# Patient Record
Sex: FEMALE | Race: WHITE | NOT HISPANIC OR LATINO | Employment: OTHER | URBAN - METROPOLITAN AREA
[De-identification: names, ages, dates, MRNs, and addresses within clinical notes are randomized per-mention and may not be internally consistent; named-entity substitution may affect disease eponyms.]

---

## 2021-12-14 ENCOUNTER — OFFICE VISIT (OUTPATIENT)
Dept: DERMATOLOGY | Age: 71
End: 2021-12-14
Payer: COMMERCIAL

## 2021-12-14 VITALS — HEIGHT: 63 IN | TEMPERATURE: 98.8 F | WEIGHT: 149 LBS | BODY MASS INDEX: 26.4 KG/M2

## 2021-12-14 DIAGNOSIS — L40.9 PSORIASIS: ICD-10-CM

## 2021-12-14 DIAGNOSIS — D22.70 MULTIPLE BENIGN MELANOCYTIC NEVI OF UPPER EXTREMITY, LOWER EXTREMITY, AND TRUNK: ICD-10-CM

## 2021-12-14 DIAGNOSIS — L81.4 SOLAR LENTIGO: Primary | ICD-10-CM

## 2021-12-14 DIAGNOSIS — D22.5 MULTIPLE BENIGN MELANOCYTIC NEVI OF UPPER EXTREMITY, LOWER EXTREMITY, AND TRUNK: ICD-10-CM

## 2021-12-14 DIAGNOSIS — D18.01 CHERRY ANGIOMA: ICD-10-CM

## 2021-12-14 DIAGNOSIS — D22.60 MULTIPLE BENIGN MELANOCYTIC NEVI OF UPPER EXTREMITY, LOWER EXTREMITY, AND TRUNK: ICD-10-CM

## 2021-12-14 DIAGNOSIS — L82.1 SEBORRHEIC KERATOSES: ICD-10-CM

## 2021-12-14 PROCEDURE — 99204 OFFICE O/P NEW MOD 45 MIN: CPT | Performed by: DERMATOLOGY

## 2021-12-14 RX ORDER — MOMETASONE FUROATE 1 MG/ML
SOLUTION TOPICAL
COMMUNITY
Start: 2021-10-05

## 2021-12-14 RX ORDER — CALCIPOTRIENE 0.05 MG/ML
SOLUTION TOPICAL
COMMUNITY

## 2021-12-14 RX ORDER — SIMVASTATIN 40 MG
TABLET ORAL
COMMUNITY
Start: 2021-10-22

## 2021-12-14 RX ORDER — BIOTIN 5 MG
TABLET ORAL DAILY
COMMUNITY

## 2021-12-14 RX ORDER — MOMETASONE FUROATE 1 MG/G
OINTMENT TOPICAL
COMMUNITY

## 2021-12-14 RX ORDER — PHENOL 1.4 %
AEROSOL, SPRAY (ML) MUCOUS MEMBRANE DAILY
COMMUNITY

## 2022-12-08 ENCOUNTER — OFFICE VISIT (OUTPATIENT)
Dept: DERMATOLOGY | Age: 72
End: 2022-12-08

## 2022-12-08 VITALS — HEIGHT: 63 IN | WEIGHT: 151 LBS | BODY MASS INDEX: 26.75 KG/M2 | TEMPERATURE: 96.7 F

## 2022-12-08 DIAGNOSIS — L82.0 INFLAMED SEBORRHEIC KERATOSIS: ICD-10-CM

## 2022-12-08 DIAGNOSIS — L81.4 SOLAR LENTIGO: Primary | ICD-10-CM

## 2022-12-08 DIAGNOSIS — L40.9 PSORIASIS: ICD-10-CM

## 2022-12-08 DIAGNOSIS — L82.1 SEBORRHEIC KERATOSIS: ICD-10-CM

## 2022-12-08 NOTE — PROGRESS NOTES
[facility]     Patient Name: Taya Youssef  Encounter Date: 12/8/2022     Have you been [facility] in the last 3 years and, if so, which description applies to you? Yes  I have been here within the last 3 years, and my medical history has NOT changed since that time  I am FEMALE/of child-bearing potential     REVIEW OF SYSTEMS:  Have you recently had or currently have any of the following? · No changes in my recent health  PAST MEDICAL HISTORY:  Have you personally ever had or currently have any of the following? If "YES," then please provide more detail  · No changes in my medical history  FAMILY HISTORY:  Any "first degree relatives" (parent, brother, sister, or child) with the following? • No changes in my family's known health  PATIENT EXPERIENCE:    • Do you want the Dermatologist to perform a COMPLETE skin exam today including a clinical examination under the "bra and underwear" areas? Yes  • If necessary, do we have your permission to call and leave a detailed message on your Preferred Phone number that includes your specific medical information?   Yes      Allergies   Allergen Reactions   • Celecoxib Other (See Comments)     UNKNOWN   • Penicillins Hives      Current Outpatient Medications:   •  calcipotriene (DOVONEX) 0 005 % topical solution, Apply topically, Disp: , Rfl:   •  calcium carbonate (OS-CHRISTINE) 600 MG tablet, Take by mouth daily, Disp: , Rfl:   •  Krill Oil 1000 MG CAPS, Take by mouth daily, Disp: , Rfl:   •  mometasone (ELOCON) 0 1 % lotion, , Disp: , Rfl:   •  Multiple Vitamin (MULTI-VITAMIN DAILY PO), Take by mouth daily, Disp: , Rfl:   •  Multiple Vitamin (MULTIVITAMIN ADULT PO), Take by mouth daily, Disp: , Rfl:   •  simvastatin (ZOCOR) 40 mg tablet, , Disp: , Rfl:   •  mometasone (ELOCON) 0 1 % ointment, Apply topically, Disp: , Rfl:           • Whom besides the patient is providing clinical information about today's encounter?   o NO ADDITIONAL HISTORIAN (patient alone provided history)    Physical Exam and Assessment/Plan by Diagnosis:  PSORIASIS    Physical Exam:  • Anatomic Location Affected:  Scalp, buttocks    • Morphological Description:  Pink scaly plaques   • Severity: moderate  • Body Percent Affected: 15%   • Pertinent Positives:  • Pertinent Negatives: Additional History of Present Condition:  Previously given mometasone lotion and calcipotriene oint  She states she currently has a flare on the scalp and possible in the buttock area     Assessment and Plan:  Based on a thorough discussion of this condition and the management approach to it (including a comprehensive discussion of the known risks, side effects and potential benefits of treatment), the patient (family) agrees to implement the following specific plan:  •  Start Clobetasol 0 05% solution topically twice a day to scalp for 14 days then as needed  for psoriasis   • Continue calcipotriene 0 0005% solution topically to affected area as a maintenance for psoriasis   • Start triamcinolone 0 25% cream twice a day for 14 days to buttock area then as needed for flares      Psoriasis is a chronic inflammatory condition that causes the body to make new skin cells in days rather than weeks  As these cells pile up on the surface of the skin, you may see thick, scaly patches of thickened skin  Psoriasis affects 2-4% of males and females  It can start at any age including childhood, with peaks of onset at 15-25 years and 50-60 years  It tends to persist lifelong, fluctuating in extent and severity  It is particularly common in Caucasians but may affect people of any race  About one-third of patients with psoriasis have family members with psoriasis  Psoriasis is multifactorial  It is classified as an immune-mediated inflammatory disease (IMID)  Genetic factors are important and influence the type of psoriasis and response to treatment       What are the signs and symptoms of psoriasis? There are many different types of psoriasis that each have present uniquely  The types of psoriasis include:    Plaque psoriasis: About 80% to 90% of people who have psoriasis develop this type  When plaque psoriasis appears, you may see:  Plaque psoriasis usually presents with symmetrically distributed, red, scaly plaques with well-defined edges  The scale is typically silvery white, except in skin folds where the plaques often appear shiny and they may have a moist peeling surface  The most common sites are scalp, elbows and knees, but any part of the skin can be involved  The plaques are usually very persistent without treatment  Itch is mostly mild but may be severe in some patients, leading to scratching and lichenification (thickened leathery skin with increased skin markings)  Painful skin cracks or fissures may occur  When psoriatic plaques clear up, they may leave brown or pale marks that can be expected to fade over several months  Guttate psoriasis: When someone gets this type of psoriasis, you often see tiny bumps appear on the skin quite suddenly  The bumps tend to cover much of the torso, legs, and arms  Sometimes, the bumps also develop on the face, scalp, and ears  No matter where they appear, the bumps tend to be:   • Small and scaly  • [place]-colored to pink  • Temporary, clearing in a few weeks or months without treatment  When guttate psoriasis clears, it may never return  Why this happens is still a bit of a mystery  Guttate psoriasis tends to develop in children and young adults who've had an infection, such as strep throat  It's possible that when the infection clears so does guttate psoriasis    It's also possible to have:  • Guttate psoriasis for life  • See the guttate psoriasis clear and plaque psoriasis develop later in life  • Plaque psoriasis when you develop guttate psoriasis  There's no way to predict what will happen after the first flare-up of guttate psoriasis clears  Inverse psoriasis: This type of psoriasis develops in areas where skin touches skin, such as the armpits, genitals, and crease of the buttocks  Where the inverse psoriasis appears, you're likely to notice:  • Smooth, red patches of skin that look raw  • Little, if any, silvery-white coating  • Sore or painful skin  Other names for this type of psoriasis are intertriginous psoriasis or flexural psoriasis  Pustular psoriasis: This type of psoriasis causes pus-filled bumps that usually appear only on the feet and hands  While the pus-filled bumps may look like an infection, the skin is not infected  The bumps don't contain bacteria or anything else that could cause an infection  Where pustular psoriasis appears, you tend to notice:  • Red, swollen skin that is dotted with pus-filled bumps  • Extremely sore or painful skin  • Brown dots (and sometimes scale) appear as the pus-filled bumps dry  Pustular psoriasis can make just about any activity that requires your hands or feet, such as typing or walking, unbearably painful  Pustular psoriasis (generalized): Serious and life-threatening, this rare type of psoriasis causes pus-filled bumps to develop on much of the skin  Also called von Zumbusch psoriasis, a flare-up causes this sequence of events:  1  Skin on most of the body suddenly turns dry, red, and tender  2  Within hours, pus-filled bumps cover most of the skin  3  Often within a day, the pus-filled bumps break open and pools of pus leak onto the skin  4  As the pus dries (usually within 24 to 48 hours), the skin dries out and peels (as shown in this picture)  5  When the dried skin peels off, you see a smooth, glazed surface  6  In a few days or weeks, you may see a new crop of pus-filled bumps covering most of the skin, as the cycle repeats itself  Anyone with pustular psoriasis also feels very sick, and may develop a fever, headache, muscle weakness, and other symptoms   Medical care is often necessary to save the person's life  Erythrodermic psoriasis: Serious and life-threatening, this type of psoriasis requires immediate medical care  When someone develops erythrodermic psoriasis, you may notice:  • Skin on most of the body looks burnt  • Chills, fever, and the person looks extremely ill  • Muscle weakness, a rapid pulse, and severe itch  The person may also be unable to keep warm, so hypothermia can set in quickly  Most people who develop this type of psoriasis already have another type of psoriasis  Before developing erythrodermic psoriasis, they often notice that their psoriasis is worsening or not improving with treatment  If you notice either of these happening, see a board-certified dermatologist     Nails    Nail psoriasis: With any type of psoriasis, you may see changes to your fingernails or toenails  About half of the people who have plaque psoriasis see signs of psoriasis on their fingernails at some point2  When psoriasis affects the nails, you may notice:  • Tiny dents in your nails (called “nail pits”)  • White, yellow, or brown discoloration under one or more nails  • Crumbling, rough nails  • A nail lifting up so that it's no longer attached  • Buildup of skin cells beneath one or more nails, which lifts up the nail  Treatment and proper nail care can help you control nail psoriasis  Psoriatic arthritis: If you have psoriasis, it's important to pay attention to your joints  Some people who have psoriasis develop a type of arthritis called psoriatic arthritis  This is more likely to occur if you have severe psoriasis  Most people notice psoriasis on their skin years before they develop psoriatic arthritis  It's also possible to get psoriatic arthritis before psoriasis, but this is less common  When psoriatic arthritis develops, the signs can be subtle   At first, you may notice:  • A swollen and tender joint, especially in a finger or toe  • Heel pain  • Swelling on the back of your leg, just above your heel  • Stiffness in the morning that fades during the day  Like psoriasis, psoriatic arthritis cannot be cured  Treatment can prevent psoriatic arthritis from worsening, which is important  Allowed to progress, psoriatic arthritis can become disabling  Diagnosis and treatment of psoriasis   Psoriasis is usually diagnosed by clinical features, and skin biopsy if necessary  It is important to decrease factors that aggravate psoriasis  These include treating streptococcal infections, minimizing skin injuries, avoiding sun exposure if it exacerbates psoriasis, smoking, alcohol usage, decreasing stress, and maintaining an optimal body weight  Certain medications such as lithium, beta blockers, antimalarials, and NSAIDs have also been implicated  Suddenly stopping oral steroids or strong topical steroids can cause rebound disease  There are many categories of psoriasis treatments available  Topical therapy  Mild psoriasis is generally treated with topical agents alone  Which treatment is selected may depend on body site, extent and severity of psoriasis  • Emollients  • Coal tar preparations  • Dithranol  • Salicylic acid  • Vitamin D analogue (calcipotriol)  • Topical corticosteroids  • Calcineurin inhibitor (tacrolimus, pimecrolimus)  Phototherapy  Most psoriasis centres offer phototherapy with ultraviolet (UV) radiation, often in combination with topical or systemic agents  Types of phototherapy include  • Narrowband UVB  • Broadband UVB  • Photochemotherapy (PUVA)  • Targeted phototherapy  Systemic therapy  Moderate to severe psoriasis warrants treatment with a systemic agent and/or phototherapy   The most common treatments are:  • Methotrexate  • Ciclosporin  • Acitretin  Other medicines occasionally used for psoriasis include:  • Mycophenolate  • Apremilast  • Hydroxyurea  • Azathioprine  • 6-mercaptopurine  Systemic corticosteroids are best avoided due to a risk of severe withdrawal flare of psoriasis and adverse effects  Biologics or targeted therapies are reserved for conventional treatment-resistant severe psoriasis, mainly because of expense, as side effects compare favorably with other systemic agents  These include:  • Anti-tumour necrosis factor-alpha antagonists (anti-TNF?) infliximab, adalimumab and etanercept  • The interleukin (IL)-12/23 antagonist ustekinumab  • IL-17 antagonists such as secukinumab  Many other monoclonal antibodies are under investigation in the treatment of psoriasis  SEBORRHEIC KERATOSIS; NON-INFLAMED    Physical Exam:  • Anatomic Location Affected:  Trunk   • Morphological Description:  Flat and raised, waxy, smooth to warty textured, yellow to brownish-grey to dark brown to blackish, discrete, "stuck-on" appearing papules  • Pertinent Positives:  • Pertinent Negatives: Additional History of Present Condition:  Patient reports new bumps on the skin  Denies itch, burn, pain, bleeding or ulceration  Present constantly; nothing seems to make it worse or better  No prior treatment  Assessment and Plan:  Based on a thorough discussion of this condition and the management approach to it (including a comprehensive discussion of the known risks, side effects and potential benefits of treatment), the patient (family) agrees to implement the following specific plan:  • Reassured benign     Seborrheic Keratosis  A seborrheic keratosis is a harmless warty spot that appears during adult life as a common sign of skin aging  Seborrheic keratoses can arise on any area of skin, covered or uncovered, with the usual exception of the palms and soles  They do not arise from mucous membranes  Seborrheic keratoses can have highly variable appearance  Seborrheic keratoses are extremely common  It has been estimated that over 90% of adults over the age of 61 years have one or more of them   They occur in males and females of all races, typically beginning to erupt in the 35s or 45s  They are uncommon under the age of 21 years  The precise cause of seborrhoeic keratoses is not known  Seborrhoeic keratoses are considered degenerative in nature  As time goes by, seborrheic keratoses tend to become more numerous  Some people inherit a tendency to develop a very large number of them; some people may have hundreds of them  The name "seborrheic keratosis" is misleading, because these lesions are not limited to a seborrhoeic distribution (scalp, mid-face, chest, upper back), nor are they formed from sebaceous glands, nor are they associated with sebum -- which is greasy  Seborrheic keratosis may also be called "SK," "Seb K," "basal cell papilloma," "senile wart," or "barnacle "      Researchers have noted:  • Eruptive seborrhoeic keratoses can follow sunburn or dermatitis  • Skin friction may be the reason they appear in body folds  • Viral cause (e g , human papillomavirus) seems unlikely  • Stable and clonal mutations or activation of FRFR3, PIK3CA, KATIE, AKT1 and EGFR genes are found in seborrhoeic keratoses  • Seborrhoeic keratosis can arise from solar lentigo  • FRFR3 mutations also arise in solar lentigines  These mutations are associated with increased age and location on the head and neck, suggesting a role of ultraviolet radiation in these lesions  • Seborrheic keratoses do not harbour tumour suppressor gene mutations  • Epidermal growth factor receptor inhibitors, which are used to treat some cancers, often result in an increase in verrucal (warty) keratoses  There is no easy way to remove multiple lesions on a single occasion  Unless a specific lesion is "inflamed" and is causing pain or stinging/burning or is bleeding, most insurance companies do not authorize treatment        INFLAMED SEBORRHEIC KERATOSIS   Physical Exam:  • Anatomic Location Affected:  Right groin   • Morphological Description:  Keratotic papule   • Pertinent Positives:  • Pertinent Negatives: Additional History of Present Condition:  Irritated     Assessment and Plan:  Based on a thorough discussion of this condition and the management approach to it (including a comprehensive discussion of the known risks, side effects and potential benefits of treatment), the patient (family) agrees to implement the following specific plan:  • Cryotherapy done in office today   • Area may become red and puffy  Scab up and fall off in 2-3 weeks   LENTIGO    Physical Exam:  • Anatomic Location Affected:  Trunk, upper and lower extremities   • Morphological Description:  Several tan brown macules   • Pertinent Positives:  • Pertinent Negatives: Additional History of Present Condition:  Here for skin exam     Assessment and Plan:  Based on a thorough discussion of this condition and the management approach to it (including a comprehensive discussion of the known risks, side effects and potential benefits of treatment), the patient (family) agrees to implement the following specific plan:  • Monitor for changes     What is a lentigo? A lentigo is a pigmented flat or slightly raised lesion with a clearly defined edge  Unlike an ephelis (freckle), it does not fade in the winter months  There are several kinds of lentigo  The name lentigo originally referred to its appearance resembling a small lentil  The plural of lentigo is lentigines, although “lentigos” is also in common use  Who gets lentigines? Lentigines can affect males and females of all ages and races  Solar lentigines are especially prevalent in fair skinned adults  Lentigines associated with syndromes are present at birth or arise during childhood  What causes lentigines? Common forms of lentigo are due to exposure to ultraviolet radiation:  • Sun damage including sunburn   • Indoor tanning   • Phototherapy, especially photochemotherapy (PUVA)    Ionizing radiation, eg radiation therapy, can also cause lentigines  Several familial syndromes associated with widespread lentigines originate from mutations in Claudy-MAP kinase, mTOR signaling and PTEN pathways  What are the clinical features of lentigines? Lentigines have been classified into several different types depending on what they look like, where they appear on the body, causative factors, and whether they are associated to other diseases or conditions  Lentigines may be solitary or more often, multiple  Most lentigines are smaller than 5 mm in diameter      Lentigo simplex  • A precursor to junctional naevus   • Arises during childhood and early adult life   • Found on trunk and limbs   • Small brown round or oval macule or thin plaque   • Jagged or smooth edge   • May have a dry surface   • May disappear in time  Solar lentigo  • A precursor to seborrhoeic keratosis   • Found on chronically sun exposed sites such as hands, face, lower legs   • May also follow sunburn to shoulders   • Yellow, light or dark brown regular or irregular macule or thin plaque   • May have a dry surface   • Often has moth-eaten outline   • Can slowly enlarge to several centimeters in diameter   • May disappear, often through the process known as lichenoid keratosis   • When atypical in appearance, may be difficult to distinguish from melanoma in situ  Ink spot lentigo  • Also known as reticulated lentigo   • Few in number compared to solar lentigines   • Follows sunburn in very fair skinned individuals   • Dark brown to black irregular ink spot-like macule  PUVA lentigo  • Similar to ink spot lentigo but follows photochemotherapy (PUVA)   • Location anywhere exposed to PUVA  Tanning bed lentigo  • Similar to ink spot lentigo but follows indoor tanning   • Location anywhere exposed to tanning bed  Radiation lentigo  • Occurs in site of irradiation (accidental or therapeutic)   • Associated with late-stage radiation dermatitis: epidermal atrophy, subcutaneous fibrosis, keratosis, telangiectasias  Melanotic macule  • Mucosal surfaces or adjacent glabrous skin eg lip, vulva, penis, anus   • Light to dark brown   • Also called mucosal melanosis  Generalised lentigines  • Found on any exposed or covered site from early childhood   • Small macules may merge to form larger patches   • Not associated with a syndrome   • Also called lentigines profusa, multiple lentigines  Agminated lentigines  • Naevoid eruption of lentigos confined to a single segmental area   • Sharp demarcation in midline   • May have associated neurological and developmental abnormalities  Patterned lentigines  • Inherited tendency to lentigines on face, lips, buttocks, palms, soles   • Recognised mainly in people of  ethnicity  Centrofacial neurodysraphic lentiginosis  Associated with mental retardation  Lentiginosis syndromes  • Syndromes include LEOPARD/[place], Peutz-Jeghers, Laugier-Hunziker, Moynahan, Xeroderma pigmentosum, myxoma syndromes (CORRAL, NAME, Max), Ruvalcaba-Myhre-Randolph, Bannayan-Zonnana syndrome, Cowden disease (multiple hamartoma syndrome )   • Inheritance is autosomal dominant; sporadic cases common   • Widespread lentigines present at birth or arise in early childhood   • Associated with neural, endocrine, and mesenchymal tumors    How is the diagnosis made? Lentigines are usually diagnosed clinically by their typical appearance  Concern regarding possibility of melanoma may lead to:  • Dermatoscopy   • Diagnostic excision biopsy    Histopathology of a lentigo shows:  • Thickened epidermis   • An increased number of melanocytes along the basal layer of epidermis   • Unlike junctional melanocytic naevus, there are no nests of melanocytes   • Increased melanin pigment within the keratinocytes   • Additional features depending on type of lentigo    In contrast, an ephelis (freckle) shows sun-induced increased melanin within the keratinocytes, without an increase in number of cells    What is the treatment for lentigines? Most lentigines are left alone  Attempts to lighten them may not be successful  The following approaches are used:  • SPF 50+ broad-spectrum sunscreen   • Hydroquinone bleaching cream   • Alpha hydroxy acids   • Vitamin C   • Retinoids   • Azelaic acid   • Chemical peels  Individual lesions can be permanently removed using:  • Cryotherapy   • Intense pulsed light   • Pigment lasers    How can lentigines be prevented? Lentigines associated with exposure ultraviolet radiation can be prevented by very careful sun protection  Clothing is more successful at preventing new lentigines than are sunscreens  What is the outlook for lentigines? Lentigines usually persist  They may increase in number with age and sun exposure  Some in sun-protected sites may fade and disappear  PROCEDURE:  DESTRUCTION OF BENIGN LESIONS WITH CRYOTHERAPY  After a thorough discussion of treatment options and risk/benefits/alternatives (including but not limited to local pain, scarring, dyspigmentation, blistering, and possible superinfection), verbal and written consent were obtained and the aforementioned lesions were treated on with cryotherapy using liquid nitrogen x 1 cycle for 5-10 seconds  TOTAL NUMBER of 1 lesions were treated today on the ANATOMIC LOCATION: right groin   The patient tolerated the procedure well, and after-care instructions were provided        Scribe Attestation    I,:  Wayne Interiano am acting as a scribe while in the presence of the attending physician :       I,:  Jazz Brown MD personally performed the services described in this documentation    as scribed in my presence :

## 2022-12-08 NOTE — PATIENT INSTRUCTIONS
PSORIASIS  Assessment and Plan:  Based on a thorough discussion of this condition and the management approach to it (including a comprehensive discussion of the known risks, side effects and potential benefits of treatment), the patient (family) agrees to implement the following specific plan:   Start Clobetasol 0 05% solution topically twice a day to scalp for 4 week then as needed  for psoriasis   Continue calcipotriene 0 0005% solution topically to affected area as a maintenance for psoriasis   Start triamcinolone 0 25% cream twice a day for 14 days to buttock area then as needed for flares      Psoriasis is a chronic inflammatory condition that causes the body to make new skin cells in days rather than weeks  As these cells pile up on the surface of the skin, you may see thick, scaly patches of thickened skin  Psoriasis affects 2-4% of males and females  It can start at any age including childhood, with peaks of onset at 15-25 years and 50-60 years  It tends to persist lifelong, fluctuating in extent and severity  It is particularly common in Caucasians but may affect people of any race  About one-third of patients with psoriasis have family members with psoriasis  Psoriasis is multifactorial  It is classified as an immune-mediated inflammatory disease (IMID)  Genetic factors are important and influence the type of psoriasis and response to treatment  SEBORRHEIC KERATOSIS; NON-INFLAMED    Assessment and Plan:  Based on a thorough discussion of this condition and the management approach to it (including a comprehensive discussion of the known risks, side effects and potential benefits of treatment), the patient (family) agrees to implement the following specific plan:  Reassured benign     Seborrheic Keratosis  A seborrheic keratosis is a harmless warty spot that appears during adult life as a common sign of skin aging    Seborrheic keratoses can arise on any area of skin, covered or uncovered, with the usual exception of the palms and soles  They do not arise from mucous membranes  Seborrheic keratoses can have highly variable appearance  Seborrheic keratoses are extremely common  It has been estimated that over 90% of adults over the age of 61 years have one or more of them  They occur in males and females of all races, typically beginning to erupt in the 35s or 45s  They are uncommon under the age of 21 years  The precise cause of seborrhoeic keratoses is not known  Seborrhoeic keratoses are considered degenerative in nature  As time goes by, seborrheic keratoses tend to become more numerous  Some people inherit a tendency to develop a very large number of them; some people may have hundreds of them  INFLAMED SEBORRHEIC KERATOSIS     Assessment and Plan:  Based on a thorough discussion of this condition and the management approach to it (including a comprehensive discussion of the known risks, side effects and potential benefits of treatment), the patient (family) agrees to implement the following specific plan:  Cryotherapy done in office today   Area may become red and puffy  Scab up and fall off in 2-3 weeks   LENTIGO  Assessment and Plan:  Based on a thorough discussion of this condition and the management approach to it (including a comprehensive discussion of the known risks, side effects and potential benefits of treatment), the patient (family) agrees to implement the following specific plan:  Monitor for changes     What is a lentigo? A lentigo is a pigmented flat or slightly raised lesion with a clearly defined edge  Unlike an ephelis (freckle), it does not fade in the winter months  There are several kinds of lentigo  The name lentigo originally referred to its appearance resembling a small lentil  The plural of lentigo is lentigines, although “lentigos” is also in common use

## 2022-12-12 ENCOUNTER — TELEPHONE (OUTPATIENT)
Dept: DERMATOLOGY | Age: 72
End: 2022-12-12

## 2022-12-12 DIAGNOSIS — L40.9 PSORIASIS: Primary | ICD-10-CM

## 2022-12-12 RX ORDER — CALCIPOTRIENE 0.05 MG/ML
1 SOLUTION TOPICAL 2 TIMES DAILY
Qty: 60 ML | Refills: 2 | Status: SHIPPED | OUTPATIENT
Start: 2022-12-12 | End: 2023-01-11

## 2022-12-12 RX ORDER — CALCIPOTRIENE 0.05 MG/ML
1 SOLUTION TOPICAL 2 TIMES DAILY
Qty: 60 ML | Refills: 1 | Status: SHIPPED | OUTPATIENT
Start: 2022-12-12

## 2022-12-12 RX ORDER — TRIAMCINOLONE ACETONIDE 0.25 MG/G
OINTMENT TOPICAL 2 TIMES DAILY
Qty: 30 G | Refills: 0 | Status: SHIPPED | OUTPATIENT
Start: 2022-12-12

## 2022-12-12 RX ORDER — TRIAMCINOLONE ACETONIDE 0.25 MG/G
OINTMENT TOPICAL 2 TIMES DAILY
Qty: 80 G | Refills: 1 | Status: SHIPPED | OUTPATIENT
Start: 2022-12-12 | End: 2022-12-26

## 2022-12-12 RX ORDER — CLOBETASOL PROPIONATE 0.46 MG/ML
SOLUTION TOPICAL 2 TIMES DAILY
Qty: 50 ML | Refills: 0 | Status: SHIPPED | OUTPATIENT
Start: 2022-12-12

## 2022-12-12 RX ORDER — CLOBETASOL PROPIONATE 0.46 MG/ML
SOLUTION TOPICAL 2 TIMES DAILY
Qty: 50 ML | Refills: 1 | Status: SHIPPED | OUTPATIENT
Start: 2022-12-12 | End: 2023-01-11

## 2022-12-12 NOTE — TELEPHONE ENCOUNTER
Please let patient know all the medications were ordered, but will resend in case they didn't go through the first time  Scripts when to [facility]

## 2022-12-12 NOTE — TELEPHONE ENCOUNTER
Pt called on 12/09 asking if her medications were sent to pharmacy from her previous visit  I advised her, a message will be sent  Please  [phone] to let her know if the medications will be sent to her pharmacy  Clobetasol 0 05% solution    Calcipotriene 0 0005% solution?     Triamcinolone 0 25% cream

## 2025-05-19 ENCOUNTER — OFFICE VISIT (OUTPATIENT)
Age: 75
End: 2025-05-19

## 2025-05-19 VITALS — TEMPERATURE: 98.5 F | WEIGHT: 147.6 LBS | BODY MASS INDEX: 26.15 KG/M2 | HEIGHT: 63 IN

## 2025-05-19 DIAGNOSIS — D18.01 CHERRY ANGIOMA: ICD-10-CM

## 2025-05-19 DIAGNOSIS — L40.9 PSORIASIS: Primary | ICD-10-CM

## 2025-05-19 DIAGNOSIS — L82.1 SEBORRHEIC KERATOSIS: ICD-10-CM

## 2025-05-19 DIAGNOSIS — L81.4 LENTIGINES: ICD-10-CM

## 2025-05-19 DIAGNOSIS — D22.9 MULTIPLE MELANOCYTIC NEVI: ICD-10-CM

## 2025-05-19 NOTE — PATIENT INSTRUCTIONS
PSORIASIS      Plan:  Discussed chronic nature of disease. Psoriasis tends to persist lifelong and fluctuates in extent and severity. To help manage the disease, decrease factors that aggravate psoriasis. This includes treating streptococcal infections, minimizing skin injuries, avoiding sun exposure if it exacerbates psoriasis, avoiding smoking and alcohol usage, decreasing stress, and maintaining an optimal body weight. Certain medications such as lithium, beta blockers, antimalarials, and NSAIDs have also been implicated. Suddenly stopping oral steroids or strong topical steroids can cause rebound disease.   Dry skin is more susceptible to outbreaks of psoriasis. Practice moisturizing throughout the day and after bathing or showering to reduce itching, dryness and scaling.  Topical Therapy: Triamcinolone 0.1% ointment: Apply a thin layer to affected areas twice daily. Do not apply to face, armpits, or groin. Make sure to give your skin breaks to avoid skin thinning and stretch marks. Apply for two weeks with a one week break in between, or apply for five days with a two day break in between.  Continue applying Calcipotriene solution twice daily as maintenance.   Continue Clobetasol Solution twice daily for up to 2 weeks for flares with one week break in between flares.  Follow up in 6 months.      PROCEDURES PERFORMED TODAY ASSOCIATED WITH THIS CONDITION:                        SEBORRHEIC KERATOSES  - Relevant exam: Scattered over the trunk/extremities are waxy brown to black plaques and papules with stuck on appearance and consistent dermoscopy  - Exam and clinical history consistent with seborrheic keratoses  - Counseled that these are benign growths that do not require treatment    MELANOCYTIC NEVI  -Relevant exam: Scattered over the trunk/extremities are homogenously pigmented brown macules and papules. ELM performed and without concerning findings. No outliers unless otherwise noted in today's note  - Exam  and clinical history consistent with melanocytic nevi  - Counseled to return to clinic prior to scheduled appointment should any of these lesions change or should any new lesions of concern arise  - Counseled on use of sun protection daily. Reviewed latest FDA sunscreen guidelines, including use of broad spectrum (UVA and UVB blocking) sunscreen or sun protective clothing with SPF 30-50 every 2-3 hours and reapplied after exposure to water    LENTIGINES  OTHER SKIN CHANGES DUE TO CHRONIC EXPOSURE TO NONIONIZING RADIATION  - Relevant exam: Over sun exposed areas are brown macules. ELM performed and without concerning findings.  - Exam and clinical history consistent with lentigines.  - Counseled to return to clinic prior to scheduled appointment should any of these lesions change or should any new lesions of concern arise.  - Recommended use of sunscreen as above and below.    CHERRY ANGIOMAS  - Relevant exam: Scattered over the trunk/extremities are red papules  - Exam and clinical history consistent with cherry angiomas  - Educated that these are benign

## 2025-05-19 NOTE — PROGRESS NOTES
"[facility] Dermatology Clinic Note     Patient Name: Blanca Dueñas  Encounter Date: 5/19/25       Have you been cared for by a [facility] Dermatologist in the last 3 years and, if so, which description applies to you? NO. I am considered a \"new\" patient and must complete all patient intake questions. I am not of child-bearing potential.     REVIEW OF SYSTEMS:  Have you recently had or currently have any of the following? Recent fever or chills? No  Any non-healing wound? No   PAST MEDICAL HISTORY:  Have you personally ever had or currently have any of the following?  If \"YES,\" then please provide more detail. Skin cancer (such as Melanoma, Basal Cell Carcinoma, Squamous Cell Carcinoma?  No  Tuberculosis, HIV/AIDS, Hepatitis B or C: No  Radiation Treatment No   HISTORY OF IMMUNOSUPPRESSION:   Do you have a history of any of the following:  Systemic Immunosuppression such as Diabetes, Biologic or Immunotherapy, Chemotherapy, Organ Transplantation, Bone Marrow Transplantation or Prednisone?  No     Answering \"YES\" requires the addition of the dotphrase \"IMMUNOSUPPRESSED\" as the first diagnosis of the patient's visit.   FAMILY HISTORY:  Any \"first degree relatives\" (parent, brother, sister, or child) with the following?    Skin Cancer, Pancreatic or Other Cancer? YES, mother-lung, brother- lung, sister-lung, sister- tongue, sister-endometrial.   PATIENT EXPERIENCE:    Do you want the Dermatologist to perform a COMPLETE skin exam today including a clinical examination under the \"bra and underwear\" areas?  Yes  If necessary, do we have your permission to call and leave a detailed message on your Preferred Phone number that includes your specific medical information?  Yes      Allergies[1] Current Medications[2]        Returning patient present for full skin check, and psoriasis.      Whom besides the patient is providing clinical information about today's encounter?   NO ADDITIONAL HISTORIAN (patient alone provided " history)    Physical Exam and Assessment/Plan by Diagnosis:      PSORIASIS    Physical Exam:  Anatomic Location: occipital vertex extending to the scalp, elbows  Morphologic Description:  Erythema and scaly patches  Pustules present? No  Severity: moderate  Body Percent Affected: 3    Pertinent Positives:  Itching? YES  Nail dystrophy (pitting, onycholysis, and/or hyperkeratosis)? YES  Dactylitis?  No  Pertinent Negatives:    Additional History of Present Condition:  Patient gets flare ups for psoriasis and manages with topicals clobetasol, calcipotriene topical solution and triamcinolone for maintenance. Uses them every day for a week and a half. Psoriasis is located on the antecubital, elbows and buttocks and back of the neck. Is experiencing joint pain in her right hand and right big toe. Does not see rheum. Yellow discoloration on both big toes and both fifth toes.   Family history of psoriasis?  No  Recent infection (strep throat)? No  Psoriatic Arthritis (PEST) Screen:   Have you ever had a swollen joint or joints?  YES, only sometimes  Has your doctor ever told you that you have arthritis?  No  Do your fingernails or toenails have holes or pits?    No  Have you had pain in your heel?  No  Have you ever had a finger or toe that was painful or swollen for no apparent reason?  YES  Rheumatoid factor NEGATIVITY?  YES  Personal history of dactylitis?  No  IMAGING STUDIES:  Juxta-articular new bone formation?  No    Plan:  Discussed chronic nature of disease. Psoriasis tends to persist lifelong and fluctuates in extent and severity. To help manage the disease, decrease factors that aggravate psoriasis. This includes treating streptococcal infections, minimizing skin injuries, avoiding sun exposure if it exacerbates psoriasis, avoiding smoking and alcohol usage, decreasing stress, and maintaining an optimal body weight. Certain medications such as lithium, beta blockers, antimalarials, and NSAIDs have also been  implicated. Suddenly stopping oral steroids or strong topical steroids can cause rebound disease.   Dry skin is more susceptible to outbreaks of psoriasis. Practice moisturizing throughout the day and after bathing or showering to reduce itching, dryness and scaling.  Topical Therapy: Triamcinolone 0.1% ointment: Apply a thin layer to affected areas twice daily. Do not apply to face, armpits, or groin. Make sure to give your skin breaks to avoid skin thinning and stretch marks. Apply for two weeks with a one week break in between, or apply for five days with a two day break in between.  Continue applying Calcipotriene solution twice daily as maintenance.   Continue Clobetasol Solution twice daily for up to 2 weeks for flares with one week break in between flares.  Follow up in 6 months.      PROCEDURES PERFORMED TODAY ASSOCIATED WITH THIS CONDITION:                        SEBORRHEIC KERATOSES  - Relevant exam: Scattered over the trunk/extremities are waxy brown to black plaques and papules with stuck on appearance and consistent dermoscopy  - Exam and clinical history consistent with seborrheic keratoses  - Counseled that these are benign growths that do not require treatment    MELANOCYTIC NEVI  -Relevant exam: Scattered over the trunk/extremities are homogenously pigmented brown macules and papules. ELM performed and without concerning findings. No outliers unless otherwise noted in today's note  - Exam and clinical history consistent with melanocytic nevi  - Counseled to return to clinic prior to scheduled appointment should any of these lesions change or should any new lesions of concern arise  - Counseled on use of sun protection daily. Reviewed latest FDA sunscreen guidelines, including use of broad spectrum (UVA and UVB blocking) sunscreen or sun protective clothing with SPF 30-50 every 2-3 hours and reapplied after exposure to water    LENTIGINES  OTHER SKIN CHANGES DUE TO CHRONIC EXPOSURE TO NONIONIZING  RADIATION  - Relevant exam: Over sun exposed areas are brown macules. ELM performed and without concerning findings.  - Exam and clinical history consistent with lentigines.  - Counseled to return to clinic prior to scheduled appointment should any of these lesions change or should any new lesions of concern arise.  - Recommended use of sunscreen as above and below.    CHERRY ANGIOMAS  - Relevant exam: Scattered over the trunk/extremities are red papules  - Exam and clinical history consistent with cherry angiomas  - Educated that these are benign      Scribe Attestation      I,:  Sandra Figueroa am acting as a scribe while in the presence of the attending physician.:       I,:  Jamia Alfaro MD personally performed the services described in this documentation    as scribed in my presence.:                  [1]   Allergies  Allergen Reactions    Celecoxib Other (See Comments)     UNKNOWN    Penicillins Hives   [2]   Current Outpatient Medications:     calcipotriene (DOVONEX) 0.005 % topical solution, Apply topically, Disp: , Rfl:     calcipotriene (DOVONEX) 0.005 % topical solution, Apply 1 application topically 2 (two) times a day, Disp: 60 mL, Rfl: 1    calcium carbonate (OS-CHRISTINE) 600 MG tablet, Take by mouth daily, Disp: , Rfl:     clobetasol (TEMOVATE) 0.05 % external solution, Apply topically 2 (two) times a day, Disp: 50 mL, Rfl: 0    Krill Oil 1000 MG CAPS, Take by mouth daily, Disp: , Rfl:     Multiple Vitamin (MULTI-VITAMIN DAILY PO), Take by mouth daily, Disp: , Rfl:     Multiple Vitamin (MULTIVITAMIN ADULT PO), Take by mouth daily, Disp: , Rfl:     simvastatin (ZOCOR) 40 mg tablet, , Disp: , Rfl:     triamcinolone (KENALOG) 0.025 % ointment, Apply topically 2 (two) times a day, Disp: 30 g, Rfl: 0

## 2025-05-20 RX ORDER — CALCIPOTRIENE 0.05 MG/ML
SOLUTION TOPICAL
Qty: 60 ML | Refills: 4 | Status: SHIPPED | OUTPATIENT
Start: 2025-05-20

## 2025-05-20 RX ORDER — CLOBETASOL PROPIONATE 0.5 MG/ML
SOLUTION TOPICAL 2 TIMES DAILY
Qty: 50 ML | Refills: 0 | Status: SHIPPED | OUTPATIENT
Start: 2025-05-20

## 2025-05-20 RX ORDER — TRIAMCINOLONE ACETONIDE 1 MG/G
OINTMENT TOPICAL
Qty: 80 G | Refills: 0 | Status: SHIPPED | OUTPATIENT
Start: 2025-05-20

## 2025-05-20 RX ORDER — KETOCONAZOLE 20 MG/ML
SHAMPOO, SUSPENSION TOPICAL
Qty: 120 ML | Refills: 11 | Status: SHIPPED | OUTPATIENT
Start: 2025-05-20

## 2025-05-21 ENCOUNTER — TELEPHONE (OUTPATIENT)
Age: 75
End: 2025-05-21

## 2025-05-21 NOTE — TELEPHONE ENCOUNTER
Patient called office to inform us that her calcipotriene topical solution requires a prior auth. Fax was received from [facility] as well.    I was able to get her prescription plan info and also informed patient that once we submit the PA it will take any time between 7-21 days for a response back. Advised her to hold off in calling [employer] until we have more information.      Patient has [facility] ninoska part D coverage   ID# : [id number]  RXBIN : [id number]  RXPCN : [id number]  [id number]: [id number]    Provider line is : [phone]  Issuer id number on card is [id number]; [id number]